# Patient Record
Sex: FEMALE | Race: WHITE | NOT HISPANIC OR LATINO | Employment: PART TIME | URBAN - NONMETROPOLITAN AREA
[De-identification: names, ages, dates, MRNs, and addresses within clinical notes are randomized per-mention and may not be internally consistent; named-entity substitution may affect disease eponyms.]

---
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
## 2024-05-08 ENCOUNTER — HOSPITAL ENCOUNTER (EMERGENCY)
Facility: HOSPITAL | Age: 19
Discharge: HOME | End: 2024-05-08
Attending: STUDENT IN AN ORGANIZED HEALTH CARE EDUCATION/TRAINING PROGRAM
Payer: COMMERCIAL

## 2024-05-08 ENCOUNTER — APPOINTMENT (EMERGENCY)
Dept: RADIOLOGY | Facility: HOSPITAL | Age: 19
End: 2024-05-08
Payer: COMMERCIAL

## 2024-05-08 VITALS
HEIGHT: 67 IN | RESPIRATION RATE: 18 BRPM | BODY MASS INDEX: 21.19 KG/M2 | SYSTOLIC BLOOD PRESSURE: 142 MMHG | DIASTOLIC BLOOD PRESSURE: 103 MMHG | TEMPERATURE: 98.3 F | HEART RATE: 112 BPM | WEIGHT: 135 LBS | OXYGEN SATURATION: 97 %

## 2024-05-08 DIAGNOSIS — M54.2 BILATERAL NECK PAIN: Primary | ICD-10-CM

## 2024-05-08 LAB
ANION GAP SERPL CALCULATED.3IONS-SCNC: 5 MMOL/L (ref 4–13)
ANISOCYTOSIS BLD QL SMEAR: PRESENT
BASOPHILS # BLD MANUAL: 0.04 THOUSAND/UL (ref 0–0.1)
BASOPHILS NFR MAR MANUAL: 1 % (ref 0–1)
BUN SERPL-MCNC: 18 MG/DL (ref 5–25)
CALCIUM SERPL-MCNC: 9.4 MG/DL (ref 8.4–10.2)
CHLORIDE SERPL-SCNC: 102 MMOL/L (ref 96–108)
CO2 SERPL-SCNC: 29 MMOL/L (ref 21–32)
CREAT SERPL-MCNC: 0.89 MG/DL (ref 0.6–1.3)
CRP SERPL QL: 8.5 MG/L
EOSINOPHIL # BLD MANUAL: 0 THOUSAND/UL (ref 0–0.4)
EOSINOPHIL NFR BLD MANUAL: 0 % (ref 0–6)
ERYTHROCYTE [DISTWIDTH] IN BLOOD BY AUTOMATED COUNT: 12 % (ref 11.6–15.1)
ERYTHROCYTE [SEDIMENTATION RATE] IN BLOOD: 3 MM/HOUR (ref 0–19)
GFR SERPL CREATININE-BSD FRML MDRD: 93 ML/MIN/1.73SQ M
GLUCOSE SERPL-MCNC: 93 MG/DL (ref 65–140)
HCT VFR BLD AUTO: 41.3 % (ref 34.8–46.1)
HGB BLD-MCNC: 14.3 G/DL (ref 11.5–15.4)
LYMPHOCYTES # BLD AUTO: 1.13 THOUSAND/UL (ref 0.6–4.47)
LYMPHOCYTES # BLD AUTO: 23 % (ref 14–44)
MACROCYTES BLD QL AUTO: PRESENT
MCH RBC QN AUTO: 29.4 PG (ref 26.8–34.3)
MCHC RBC AUTO-ENTMCNC: 34.6 G/DL (ref 31.4–37.4)
MCV RBC AUTO: 85 FL (ref 82–98)
MICROCYTES BLD QL AUTO: PRESENT
MONOCYTES # BLD AUTO: 0.43 THOUSAND/UL (ref 0–1.22)
MONOCYTES NFR BLD: 10 % (ref 4–12)
NEUTROPHILS # BLD MANUAL: 2.73 THOUSAND/UL (ref 1.85–7.62)
NEUTS BAND NFR BLD MANUAL: 3 % (ref 0–8)
NEUTS SEG NFR BLD AUTO: 60 % (ref 43–75)
OVALOCYTES BLD QL SMEAR: PRESENT
PLATELET # BLD AUTO: 154 THOUSANDS/UL (ref 149–390)
PLATELET BLD QL SMEAR: ADEQUATE
PMV BLD AUTO: 8.9 FL (ref 8.9–12.7)
POIKILOCYTOSIS BLD QL SMEAR: PRESENT
POTASSIUM SERPL-SCNC: 4.4 MMOL/L (ref 3.5–5.3)
RBC # BLD AUTO: 4.86 MILLION/UL (ref 3.81–5.12)
RBC MORPH BLD: PRESENT
SODIUM SERPL-SCNC: 136 MMOL/L (ref 135–147)
VARIANT LYMPHS # BLD AUTO: 3 %
WBC # BLD AUTO: 4.34 THOUSAND/UL (ref 4.31–10.16)

## 2024-05-08 PROCEDURE — 80048 BASIC METABOLIC PNL TOTAL CA: CPT | Performed by: STUDENT IN AN ORGANIZED HEALTH CARE EDUCATION/TRAINING PROGRAM

## 2024-05-08 PROCEDURE — 96374 THER/PROPH/DIAG INJ IV PUSH: CPT

## 2024-05-08 PROCEDURE — 85027 COMPLETE CBC AUTOMATED: CPT | Performed by: STUDENT IN AN ORGANIZED HEALTH CARE EDUCATION/TRAINING PROGRAM

## 2024-05-08 PROCEDURE — 86140 C-REACTIVE PROTEIN: CPT | Performed by: STUDENT IN AN ORGANIZED HEALTH CARE EDUCATION/TRAINING PROGRAM

## 2024-05-08 PROCEDURE — 36415 COLL VENOUS BLD VENIPUNCTURE: CPT | Performed by: STUDENT IN AN ORGANIZED HEALTH CARE EDUCATION/TRAINING PROGRAM

## 2024-05-08 PROCEDURE — 85007 BL SMEAR W/DIFF WBC COUNT: CPT | Performed by: STUDENT IN AN ORGANIZED HEALTH CARE EDUCATION/TRAINING PROGRAM

## 2024-05-08 PROCEDURE — 85652 RBC SED RATE AUTOMATED: CPT | Performed by: STUDENT IN AN ORGANIZED HEALTH CARE EDUCATION/TRAINING PROGRAM

## 2024-05-08 PROCEDURE — 99283 EMERGENCY DEPT VISIT LOW MDM: CPT

## 2024-05-08 PROCEDURE — 99285 EMERGENCY DEPT VISIT HI MDM: CPT | Performed by: STUDENT IN AN ORGANIZED HEALTH CARE EDUCATION/TRAINING PROGRAM

## 2024-05-08 PROCEDURE — 70360 X-RAY EXAM OF NECK: CPT

## 2024-05-08 RX ORDER — METHOCARBAMOL 500 MG/1
500 TABLET, FILM COATED ORAL ONCE
Status: COMPLETED | OUTPATIENT
Start: 2024-05-08 | End: 2024-05-08

## 2024-05-08 RX ORDER — NAPROXEN 500 MG/1
500 TABLET ORAL 2 TIMES DAILY PRN
Qty: 30 TABLET | Refills: 0 | Status: SHIPPED | OUTPATIENT
Start: 2024-05-08

## 2024-05-08 RX ORDER — METHOCARBAMOL 500 MG/1
500 TABLET, FILM COATED ORAL 2 TIMES DAILY PRN
Qty: 20 TABLET | Refills: 0 | Status: SHIPPED | OUTPATIENT
Start: 2024-05-08

## 2024-05-08 RX ORDER — KETOROLAC TROMETHAMINE 30 MG/ML
15 INJECTION, SOLUTION INTRAMUSCULAR; INTRAVENOUS ONCE
Status: COMPLETED | OUTPATIENT
Start: 2024-05-08 | End: 2024-05-08

## 2024-05-08 RX ADMIN — KETOROLAC TROMETHAMINE 15 MG: 30 INJECTION, SOLUTION INTRAMUSCULAR; INTRAVENOUS at 20:21

## 2024-05-08 RX ADMIN — METHOCARBAMOL TABLETS 500 MG: 500 TABLET, COATED ORAL at 20:20

## 2024-05-09 NOTE — DISCHARGE INSTRUCTIONS
You are being prescribed a course of naproxen (anti-inflammatory) and Robaxin (muscle relaxant) for treatment of neck pain.    In addition to these medications, you can take Tylenol 1000 mg every 6 hours.  Apply warm compresses to help relax your neck muscles.    Follow-up with your primary care provider.  Return to the emergency department for any concerning signs or symptoms.

## 2024-05-09 NOTE — ED PROVIDER NOTES
History  Chief Complaint   Patient presents with    Sore Throat     Pt reports she's had a sore throat and swollen tonsils for the past two months. Has seen UC once and Family doc twice. Has been on multiple rounds of antibiotics. Negative for strep and mono. Now c/o stiffness in her neck and back that started Monday but got worse today.       History provided by:  Patient, parent and medical records    19-year-old female.  Presents with bilateral neck pain/stiffness. States that she developed neck stiffness x 2 days ago. Denies known recent injury. Did not take anything for pain. Of note, the patient has been having a sore throat x 2 months. Has been prescribed multiple rounds of antibiotics. Has been testing negative for GAS and IM. The patient denies recent fever/chills, nausea/vomiting/diarrhea.  Has been eating/drinking well.  Tolerating secretions.  No changes in phonation.    History reviewed. No pertinent past medical history.    History reviewed. No pertinent surgical history.    History reviewed. No pertinent family history.  I have reviewed and agree with the history as documented.    E-Cigarette/Vaping    E-Cigarette Use Never User      E-Cigarette/Vaping Substances     Social History     Tobacco Use    Smoking status: Never    Smokeless tobacco: Never   Vaping Use    Vaping status: Never Used   Substance Use Topics    Alcohol use: Never    Drug use: Never       Review of Systems   Constitutional:  Negative for activity change, appetite change, fatigue and fever.   HENT:  Positive for sore throat.    Respiratory:  Negative for cough, chest tightness and shortness of breath.    Gastrointestinal:  Negative for abdominal pain, nausea and vomiting.   Musculoskeletal:  Positive for neck pain and neck stiffness. Negative for arthralgias and back pain.   Skin:  Negative for color change, pallor, rash and wound.   All other systems reviewed and are negative.    Physical Exam  Physical Exam  Vitals and nursing  note reviewed.   Constitutional:       General: She is not in acute distress.     Appearance: She is not ill-appearing or toxic-appearing.   HENT:      Head: Normocephalic and atraumatic.      Right Ear: Tympanic membrane and ear canal normal. No tenderness. No middle ear effusion. Tympanic membrane is not erythematous.      Left Ear: Tympanic membrane and ear canal normal. No tenderness.  No middle ear effusion. Tympanic membrane is not erythematous.      Nose: No congestion or rhinorrhea.      Mouth/Throat:      Mouth: Mucous membranes are moist. No oral lesions.      Pharynx: No pharyngeal swelling, oropharyngeal exudate or posterior oropharyngeal erythema.      Tonsils: No tonsillar exudate or tonsillar abscesses. 1+ on the right. 1+ on the left.   Eyes:      Pupils: Pupils are equal, round, and reactive to light.   Cardiovascular:      Rate and Rhythm: Normal rate and regular rhythm.      Heart sounds: Normal heart sounds. No murmur heard.  Pulmonary:      Effort: Pulmonary effort is normal. No respiratory distress.      Breath sounds: Normal breath sounds. No wheezing or rhonchi.   Chest:      Chest wall: No tenderness.   Abdominal:      General: Bowel sounds are normal.      Palpations: Abdomen is soft.      Tenderness: There is no abdominal tenderness. There is no guarding or rebound.      Hernia: No hernia is present.   Musculoskeletal:      Cervical back: Neck supple. Pain with movement and muscular tenderness present. Decreased range of motion.   Skin:     General: Skin is warm and dry.      Coloration: Skin is not pale.      Findings: No erythema or rash.   Neurological:      General: No focal deficit present.      Mental Status: She is alert and oriented to person, place, and time.   Psychiatric:         Mood and Affect: Mood normal.         Behavior: Behavior normal.       Vital Signs  ED Triage Vitals [05/08/24 1943]   Temperature Pulse Respirations Blood Pressure SpO2   98.3 °F (36.8 °C) (!) 112 18  (!) 142/103 97 %      Temp Source Heart Rate Source Patient Position - Orthostatic VS BP Location FiO2 (%)   Temporal Monitor Sitting Left arm --      Pain Score       8         Vitals:    05/08/24 1943   BP: (!) 142/103   Pulse: (!) 112   Patient Position - Orthostatic VS: Sitting     ED Medications  Medications   ketorolac (TORADOL) injection 15 mg (15 mg Intravenous Given 5/8/24 2021)   methocarbamol (ROBAXIN) tablet 500 mg (500 mg Oral Given 5/8/24 2020)     Diagnostic Studies  Results Reviewed       Procedure Component Value Units Date/Time    Manual Differential(PHLEBS Do Not Order) [[id number]]  (Abnormal) Collected: 05/08/24 2020    Lab Status: Final result Specimen: Blood from Arm, Left Updated: 05/08/24 2058     Segmented % 60 %      Bands % 3 %      Lymphocytes % 23 %      Monocytes % 10 %      Eosinophils % 0 %      Basophils % 1 %      Atypical Lymphocytes % 3 %      Absolute Neutrophils 2.73 Thousand/uL      Absolute Lymphocytes 1.13 Thousand/uL      Absolute Monocytes 0.43 Thousand/uL      Absolute Eosinophils 0.00 Thousand/uL      Absolute Basophils 0.04 Thousand/uL      Total Counted --     RBC Morphology Present     Platelet Estimate Adequate     Anisocytosis Present     Macrocytes Present     Microcytes Present     Ovalocytes Present     Poikilocytes Present    Sedimentation rate, automated [[id number]]  (Normal) Collected: 05/08/24 2020    Lab Status: Final result Specimen: Blood from Arm, Left Updated: 05/08/24 2043     Sed Rate 3 mm/hour     Basic metabolic panel [[id number]] Collected: 05/08/24 2020    Lab Status: Final result Specimen: Blood from Arm, Left Updated: 05/08/24 2040     Sodium 136 mmol/L      Potassium 4.4 mmol/L      Chloride 102 mmol/L      CO2 29 mmol/L      ANION GAP 5 mmol/L      BUN 18 mg/dL      Creatinine 0.89 mg/dL      Glucose 93 mg/dL      Calcium 9.4 mg/dL      eGFR 93 ml/min/1.73sq m     Narrative:      [employer] Kid[employer] guidelines for Chronic Kidney  Disease (CKD):     Stage 1 with normal or high GFR (GFR > 90 mL/min/1.73 square meters)    Stage 2 Mild CKD (GFR = 60-89 mL/min/1.73 square meters)    Stage 3A Moderate CKD (GFR = 45-59 mL/min/1.73 square meters)    Stage 3B Moderate CKD (GFR = 30-44 mL/min/1.73 square meters)    Stage 4 Severe CKD (GFR = 15-29 mL/min/1.73 square meters)    Stage 5 End Stage CKD (GFR <15 mL/min/1.73 square meters)  Note: GFR calculation is accurate only with a steady state creatinine    C-reactive protein [[id number]]  (Abnormal) Collected: 05/08/24 2020    Lab Status: Final result Specimen: Blood from Arm, Left Updated: 05/08/24 2040     CRP 8.5 mg/L     CBC and differential [[id number]]  (Normal) Collected: 05/08/24 2020    Lab Status: Final result Specimen: Blood from Arm, Left Updated: 05/08/24 2030     WBC 4.34 Thousand/uL      RBC 4.86 Million/uL      Hemoglobin 14.3 g/dL      Hematocrit 41.3 %      MCV 85 fL      MCH 29.4 pg      MCHC 34.6 g/dL      RDW 12.0 %      MPV 8.9 fL      Platelets 154 Thousands/uL                    XR neck soft tissue   ED Interpretation by Andrew Rodriguez DO (05/08 2128)   No acute findings.              Procedures  Procedures     ED Course  ED Course as of 05/09/24 0009   Wed May 08, 2024   2013 Vital signs reviewed.  Patient expressing bilateral neck pain/stiffness.  Denies recent fever/chills.  Vaccinations are up-to-date.  Clinical picture is not consistent with bacterial meningitis.  Will obtain basic labs, administer a dose of Toradol/Robaxin.   2102 Serum laboratory workup is largely unremarkable.  No leukocytosis.  Inflammatory markers are largely unremarkable.  Upon reevaluation, the patient has improved range of motion with flexion extension, rightward/leftward rotation.  Will obtain x-ray imaging.   2128 Neck soft tissue x-ray without acute findings.  Low suspicion for RPA. PE findings without signs of PTA.  Per chart review, the patient had negative EBV/IM work in April 2024.    2138  "Upon reevaluation, the patient's neck pain continues to improve.  Likely MSK related pain.  Low suspicion for CNS infection. PRN naproxen/Robaxin prescribed.  Other supportive care measures/return precautions were discussed with the patient and her mother.  All questions addressed.  Stable for discharge.         CRAFFT      Flowsheet Row Most Recent Value   CRAFFT Initial Screen: During the past 12 months, did you:    1. Drink any alcohol (more than a few sips)?  No Filed at: 05/08/2024 1946   2. Smoke any marijuana or hashish No Filed at: 05/08/2024 1946   3. Use anything else to get high? (\"anything else\" includes illegal drugs, over the counter and prescription drugs, and things that you sniff or 'mcdaniel')? No Filed at: 05/08/2024 1946          Medical Decision Making  This patient presents with bilateral neck pain.   Diagnostic considerations include RPA, PTA, torticollis, meningitis, cervical strain. See ED Course.         Problems Addressed:  Bilateral neck pain: acute illness or injury    Amount and/or Complexity of Data Reviewed  External Data Reviewed: labs and notes.  Labs: ordered. Decision-making details documented in ED Course.  Radiology: ordered and independent interpretation performed. Decision-making details documented in ED Course.    Risk  Prescription drug management.             Disposition  Final diagnoses:   Bilateral neck pain     Time reflects when diagnosis was documented in both MDM as applicable and the Disposition within this note       Time User Action Codes Description Comment    5/8/2024  9:40 PM Andrew Rodriguez Add [M54.2] Bilateral neck pain           ED Disposition       ED Disposition   Discharge    Condition   Stable    Date/Time   Wed May 8, 2024 2140    Comment   Ana Clauser discharge to home/self care.                   Follow-up Information    None         Discharge Medication List as of 5/8/2024  9:42 PM        START taking these medications    Details   methocarbamol " (ROBAXIN) 500 mg tablet Take 1 tablet (500 mg total) by mouth 2 (two) times a day as needed for muscle spasms, Starting Wed 5/8/2024, Normal      naproxen (Naprosyn) 500 mg tablet Take 1 tablet (500 mg total) by mouth 2 (two) times a day as needed for mild pain, Starting Wed 5/8/2024, Normal             No discharge procedures on file.    PDMP Review       None            ED Provider  Electronically Signed by             Andrew Rodriguez DO  05/09/24 0009

## 2025-03-08 ENCOUNTER — APPOINTMENT (OUTPATIENT)
Dept: URGENT CARE | Facility: CLINIC | Age: 20
End: 2025-03-08